# Patient Record
Sex: MALE | Race: WHITE
[De-identification: names, ages, dates, MRNs, and addresses within clinical notes are randomized per-mention and may not be internally consistent; named-entity substitution may affect disease eponyms.]

---

## 2017-11-14 ENCOUNTER — HOSPITAL ENCOUNTER (OUTPATIENT)
Dept: HOSPITAL 45 - C.CTS | Age: 67
Discharge: HOME | End: 2017-11-14
Attending: PSYCHIATRY & NEUROLOGY
Payer: COMMERCIAL

## 2017-11-14 DIAGNOSIS — R29.6: Primary | ICD-10-CM

## 2017-11-14 NOTE — DIAGNOSTIC IMAGING REPORT
HEAD CT NONCONTRAST



CT DOSE: 614.27 mGy.cm



HISTORY:      R29.6 Multiple falls [id number]



TECHNIQUE: Multiaxial CT images of the head were performed without the use of

intravenous contrast. Automated exposure control was utilized for this study.  A

dose lowering technique was utilized adhering to the principles of ALARA.



Comparison: None.



Findings: The paranasal sinuses and mastoid air cells are clear. The calvarium

and skull base are intact. The ventricles and sulci are within normal limits.

There is no mass, hematoma, midline shift, or acute infarct.



Impression:

No acute intracranial abnormality. 







Electronically signed by:  Иван Cotto M.D.

11/14/2017 11:49 AM



Dictated Date/Time:  11/14/2017 11:39 AM

## 2017-11-20 LAB
ANION GAP SERPL CALC-SCNC: 8 MMOL/L (ref 3–11)
APPEARANCE UR: CLEAR
BASOPHILS # BLD: 0.02 K/UL (ref 0–0.2)
BASOPHILS NFR BLD: 0.3 %
BILIRUB UR-MCNC: (no result) MG/DL
BUN SERPL-MCNC: 14 MG/DL (ref 7–18)
BUN/CREAT SERPL: 14.8 (ref 10–20)
CALCIUM SERPL-MCNC: 9 MG/DL (ref 8.5–10.1)
CHLORIDE SERPL-SCNC: 101 MMOL/L (ref 98–107)
CO2 SERPL-SCNC: 29 MMOL/L (ref 21–32)
COLOR UR: YELLOW
COMPLETE: YES
CREAT CL PREDICTED SERPL C-G-VRATE: 77.7 ML/MIN
CREAT SERPL-MCNC: 0.94 MG/DL (ref 0.6–1.4)
EOSINOPHIL NFR BLD AUTO: 281 K/UL (ref 130–400)
GLUCOSE SERPL-MCNC: 89 MG/DL (ref 70–99)
HCT VFR BLD CALC: 41.9 % (ref 42–52)
IG%: 0.3 %
IMM GRANULOCYTES NFR BLD AUTO: 23.9 %
INR PPP: 1.1 (ref 0.9–1.1)
LYMPHOCYTES # BLD: 1.62 K/UL (ref 1.2–3.4)
MANUAL MICROSCOPIC REQUIRED?: NO
MCH RBC QN AUTO: 31.1 PG (ref 25–34)
MCHC RBC AUTO-ENTMCNC: 35.1 G/DL (ref 32–36)
MCV RBC AUTO: 88.8 FL (ref 80–100)
MONOCYTES NFR BLD: 8.7 %
NEUTROPHILS # BLD AUTO: 2.4 %
NEUTROPHILS NFR BLD AUTO: 64.4 %
NITRITE UR QL STRIP: (no result)
PARTIAL THROMBOPLASTIN RATIO: 1.2
PH UR STRIP: 6.5 [PH] (ref 4.5–7.5)
PMV BLD AUTO: 8.7 FL (ref 7.4–10.4)
POTASSIUM SERPL-SCNC: 3.9 MMOL/L (ref 3.5–5.1)
PROTHROMBIN TIME: 11.6 SECONDS (ref 9–12)
RBC # BLD AUTO: 4.72 M/UL (ref 4.7–6.1)
REVIEW REQ?: NO
SODIUM SERPL-SCNC: 138 MMOL/L (ref 136–145)
SP GR UR STRIP: 1.02 (ref 1–1.03)
URINE BILL WITH OR WITHOUT MIC: (no result)
UROBILINOGEN UR-MCNC: (no result) MG/DL
WBC # BLD AUTO: 6.78 K/UL (ref 4.8–10.8)

## 2017-11-20 NOTE — DIAGNOSTIC IMAGING REPORT
CHEST 2 VIEWS ROUTINE



CLINICAL HISTORY: Preoperative chest    



COMPARISON STUDY:  No previous studies for comparison.



FINDINGS: The cardiac and mediastinal contours are normal. There is no evidence

of focal pulmonary consolidation. There is no evidence of failure. No pleural

effusions are visualized.[ 



IMPRESSION: No active disease in the chest.







Electronically signed by:  Yuri Rosa M.D.

11/20/2017 4:05 PM



Dictated Date/Time:  11/20/2017 4:04 PM

## 2017-11-20 NOTE — PAT MEDICATION INSTRUCTIONS
Service Date


Nov 20, 2017.





Current Home Medication List


Aspirin (Aspirin Ec), 81 MG PO QAM


Cholecalciferol (Vitamin D3), 5,000 TAB PO QAM


Glucosamine Sulfate (Glucosamine), 1,000 MG PO


Memantine (Namenda), 28 MG PO QAM


Multivitamin (Multivitamin), 1 TAB PO QPM


Naproxen (Aleve), 220 MG PO PRN


Ocuvite Preservision (Ocuvite Preservision), 1 TAB PO BID


[Vitamin B12], 1 TAB PO QAM





Medication Instructions


For Your Scheduled Surgery 





- Check with surgeon for instructions: 


Naproxen (Aleve), 220 MG PO PRN


Aspirin (Aspirin Ec), 81 MG PO QAM








- Hold the following medications 2 weeks prior to surgery:


Glucosamine Sulfate (Glucosamine), 1,000 MG PO








- Hold the following medications the morning of surgery:


Cholecalciferol (Vitamin D3), 5,000 TAB PO QAM


Multivitamin (Multivitamin), 1 TAB PO QPM


Ocuvite Preservision (Ocuvite Preservision), 1 TAB PO BID


[Vitamin B12], 1 TAB PO QAM








- Take the following medications the morning of surgery with a sip of water:


Memantine (Namenda), 28 MG PO QAM








If you have any questions please call us at [phone] or [phone] or 

[phone]

## 2017-11-21 LAB — EST. AVERAGE GLUCOSE BLD GHB EST-MCNC: 105 MG/DL

## 2017-11-21 NOTE — HISTORY & PHYSICAL EXAMINATION
DATE OF ADMISSION:  12/05/2017

 

CHIEF COMPLAINT:  Right knee pain.

 

HISTORY OF PRESENT ILLNESS:  Mr. Santana is a 67-year-old male with a 1-year

history of right knee pain.  He rates his pain at 6/10.  He has pain with his

daily activities.  He has limited standing and walking tolerance.  Pain is

worse with weightbearing.  The patient has had injections, NSAIDs, and knee

brace in the past without relief.  He also has previous history of right knee

meniscectomy and physical therapy.  He has failed conservative treatment and

is scheduled for right knee replacement.

 

PAST MEDICAL HISTORY:  History of prostate cancer and dementia.  He denies

heart disease, diabetes or DVT.

 

PAST SURGICAL HISTORY:  Right knee meniscectomy and prostatectomy.

 

SOCIAL HISTORY:  The patient drinks 6 drinks per week.  He denies tobacco

use.  He lives in a 2-story home.  He is  and retired.

 

FAMILY HISTORY:  Negative for DVT.

 

MEDICATIONS:  Ocuvite 1 tablet daily, glucosamine 500 mg, vitamin D3 at 5000

units, vitamin B12 at 1000 mcg, Namenda 10 mg 2 times daily, Centrum Silver

400 mcg, aspirin 81 mg daily, and Aleve p.r.n.

 

ALLERGIES:  None.

 

REVIEW OF SYSTEMS:  See HPI.  Ten other systems reviewed, all negative.

 

PHYSICAL EXAMINATION:

VITAL SIGNS:  Height 5 feet 10 inches.  Weight 165 pounds.

GENERAL:  This is a well-developed and well-nourished male, who is alert and

oriented x3.  Mood and affect are appropriate.

HEENT:  Normocephalic and atraumatic.  Mucous membranes are moist and intact.

NECK:  Supple without lymphadenopathy.

HEART:  Regular rate and rhythm without murmurs, rubs or gallops.

LUNGS:  Clear to auscultation without wheezes or rhonchi.

ABDOMEN:  Soft and nontender.  Bowel sounds are equal and active.

EXTREMITIES:  No ecchymosis, redness or warmth.  He has a varus deformity. 

He has scar medially.  Range of motion is from 0-120 degrees with +1 laxity. 

He has mild effusion.  He has no distal edema.  He is neurovascularly intact

with +5/5 strength.

 

X-RAY EXAMINATION:  AP and lateral views show joint space narrowing and

osteophyte formation.

 

IMPRESSION:  Degenerative joint disease, right knee.

 

PLAN:  The patient will be admitted for a right total knee arthroplasty.  We

will plan on aspirin 325 mg b.i.d. for DVT prophylaxis.  He will have

[facility] for home physical therapy upon discharge.

## 2017-12-05 ENCOUNTER — HOSPITAL ENCOUNTER (INPATIENT)
Dept: HOSPITAL 45 - C.ACU | Age: 67
LOS: 1 days | Discharge: HOME HEALTH | DRG: 470 | End: 2017-12-06
Attending: ORTHOPAEDIC SURGERY | Admitting: ORTHOPAEDIC SURGERY
Payer: COMMERCIAL

## 2017-12-05 VITALS — HEART RATE: 73 BPM | SYSTOLIC BLOOD PRESSURE: 104 MMHG | OXYGEN SATURATION: 98 % | DIASTOLIC BLOOD PRESSURE: 66 MMHG

## 2017-12-05 VITALS
HEART RATE: 70 BPM | TEMPERATURE: 97.7 F | DIASTOLIC BLOOD PRESSURE: 53 MMHG | OXYGEN SATURATION: 94 % | SYSTOLIC BLOOD PRESSURE: 102 MMHG

## 2017-12-05 VITALS
DIASTOLIC BLOOD PRESSURE: 91 MMHG | OXYGEN SATURATION: 98 % | SYSTOLIC BLOOD PRESSURE: 149 MMHG | HEART RATE: 84 BPM | TEMPERATURE: 97.88 F

## 2017-12-05 VITALS
OXYGEN SATURATION: 94 % | DIASTOLIC BLOOD PRESSURE: 65 MMHG | SYSTOLIC BLOOD PRESSURE: 109 MMHG | HEART RATE: 77 BPM | TEMPERATURE: 98.24 F

## 2017-12-05 VITALS
TEMPERATURE: 98.24 F | SYSTOLIC BLOOD PRESSURE: 92 MMHG | HEART RATE: 73 BPM | OXYGEN SATURATION: 96 % | DIASTOLIC BLOOD PRESSURE: 52 MMHG

## 2017-12-05 VITALS
DIASTOLIC BLOOD PRESSURE: 60 MMHG | HEART RATE: 77 BPM | TEMPERATURE: 97.7 F | OXYGEN SATURATION: 94 % | SYSTOLIC BLOOD PRESSURE: 100 MMHG

## 2017-12-05 VITALS — OXYGEN SATURATION: 98 % | SYSTOLIC BLOOD PRESSURE: 109 MMHG | HEART RATE: 81 BPM | DIASTOLIC BLOOD PRESSURE: 66 MMHG

## 2017-12-05 VITALS — DIASTOLIC BLOOD PRESSURE: 61 MMHG | SYSTOLIC BLOOD PRESSURE: 105 MMHG | HEART RATE: 72 BPM | OXYGEN SATURATION: 98 %

## 2017-12-05 VITALS
WEIGHT: 56.22 LBS | BODY MASS INDEX: 8.05 KG/M2 | HEIGHT: 70 IN | WEIGHT: 56.22 LBS | HEIGHT: 70 IN | BODY MASS INDEX: 8.05 KG/M2 | BODY MASS INDEX: 8.05 KG/M2

## 2017-12-05 VITALS — DIASTOLIC BLOOD PRESSURE: 58 MMHG | SYSTOLIC BLOOD PRESSURE: 97 MMHG | HEART RATE: 78 BPM | OXYGEN SATURATION: 98 %

## 2017-12-05 DIAGNOSIS — Z72.89: ICD-10-CM

## 2017-12-05 DIAGNOSIS — Z79.82: ICD-10-CM

## 2017-12-05 DIAGNOSIS — M17.11: Primary | ICD-10-CM

## 2017-12-05 DIAGNOSIS — F03.90: ICD-10-CM

## 2017-12-05 DIAGNOSIS — Z85.46: ICD-10-CM

## 2017-12-05 PROCEDURE — 0SRC0J9 REPLACEMENT OF RIGHT KNEE JOINT WITH SYNTHETIC SUBSTITUTE, CEMENTED, OPEN APPROACH: ICD-10-PCS | Performed by: ORTHOPAEDIC SURGERY

## 2017-12-05 RX ADMIN — ACETAMINOPHEN SCH MG: 500 TABLET, COATED ORAL at 14:12

## 2017-12-05 RX ADMIN — Medication SCH MG: at 20:57

## 2017-12-05 RX ADMIN — ALUMINUM ZIRCONIUM TRICHLOROHYDREX GLY SCH EA: 0.2 STICK TOPICAL at 15:42

## 2017-12-05 RX ADMIN — ACETAMINOPHEN SCH MG: 500 TABLET, COATED ORAL at 20:58

## 2017-12-05 RX ADMIN — DOCUSATE SODIUM SCH MG: 100 CAPSULE, LIQUID FILLED ORAL at 20:58

## 2017-12-05 RX ADMIN — CEFAZOLIN SCH MLS/MIN: 10 INJECTION, POWDER, FOR SOLUTION INTRAVENOUS at 15:41

## 2017-12-05 RX ADMIN — TRANEXAMIC ACID SCH MLS/MIN: 100 INJECTION, SOLUTION INTRAVENOUS at 13:21

## 2017-12-05 RX ADMIN — SODIUM CHLORIDE SCH MLS/HR: 900 INJECTION, SOLUTION INTRAVENOUS at 14:12

## 2017-12-05 RX ADMIN — TRANEXAMIC ACID SCH MLS/MIN: 100 INJECTION, SOLUTION INTRAVENOUS at 06:45

## 2017-12-05 RX ADMIN — SODIUM CHLORIDE SCH MLS/HR: 900 INJECTION, SOLUTION INTRAVENOUS at 22:37

## 2017-12-05 NOTE — ANESTHESIOLOGY PROGRESS NOTE
Anesthesia Post Op Note


Date & Time


Dec 5, 2017 at 10:54





Vital Signs


Pain Intensity:  0





Vital Signs Past 12 Hours








  Date Time  Temp Pulse Resp B/P (MAP) Pulse Ox O2 Delivery O2 Flow Rate FiO2


 


12/5/17 10:50 37 71 16 110/77 97 Nasal Cannula 2 


 


12/5/17 10:40  73 16 115/68 98 Nasal Cannula 2 


 


12/5/17 10:30  74 16 115/70 97 Nasal Cannula 2 


 


12/5/17 10:20  80 19 114/69 97 Nasal Cannula 2 


 


12/5/17 10:10  78 16 103/60 95 Oxymask 10 


 


12/5/17 10:00 36.8 89 16 107/59 96 Oxymask 10 


 


12/5/17 05:49 36.6 84 18 149/91 98 Room Air  











Notes


Mental Status:  alert / awake / arousable, participated in evaluation


Pt Amnestic to Procedure:  Yes


Nausea / Vomiting:  adequately controlled


Pain:  adequately controlled


Airway Patency, RR, SpO2:  stable & adequate


BP & HR:  stable & adequate


Hydration State:  stable & adequate


Anesthetic Complications:  no major complications apparent

## 2017-12-05 NOTE — DIAGNOSTIC IMAGING REPORT
TWO VIEWS RIGHT KNEE



CLINICAL HISTORY:  Postoperative examination.



FINDINGS: AP and crosstable lateral portable views of the right knee are

obtained. A right knee arthroplasty is in near anatomic alignment. There has

been undersurface remodeling of the patella. No acute fracture is seen. There

are expected postoperative changes around the knee including a surgical drain,

soft tissue edema, and subcutaneous gas.



IMPRESSION: Expected postoperative changes status post right knee arthroplasty.

No acute fracture is seen.







Electronically signed by:  Diaz Antony M.D.

12/5/2017 10:29 AM



Dictated Date/Time:  12/5/2017 10:28 AM

## 2017-12-05 NOTE — [facility] OPERATIVE REPORT
Operative Report


Operative Date


Dec 5, 2017.





Pre-Operative Diagnosis





Degenerative joint disease, right knee





Post-Operative Diagnosis





Same as preop





Procedure(s) Performed





Right total knee arthroplasty





Surgeon


Dr. Casarez





Assistant Surgeon(s)


J. Illig, PA-C





Estimated Blood Loss


25 cc





Findings


see dictated op note





Specimens





A: right knee bone and tissue





Drains


none





Anesthesia


spinal





Complication(s)


None





Disposition


Recovery Room / PACU





Indications


A 67-year-old male presents with long history of right knee severe 

tricompartmental DJD which is failed outpatient conservative treatments 

including NSAIDs, bracing, cortisone injections home walking/exercise program.  

His symptoms have progressed to the point where it has been difficult for him 

to perform normal activities of daily living.  





I have indicated the patient for a right total knee arthroplasty, the risks and 

benefits and complications of the procedure include but are not limited to 

infection bleeding damage to bone nerves vessels surrounding soft tissue, blood 

clots loss of function leg length discrepancy dislocation failure of the 

components need for additional surgery and death. 





The patient wished to proceed with surgery at this time and informed consent 

was obtained.  Appropriate clearances were obtained.





Description of Procedure


Following induction of spinal anesthesia, a tourniquet was applied to the 

proximal aspect of the  thigh and the patient's right leg was prepped and 

draped in the usual sterile manner.  A timeout was performed and site mk 

verified.    Limb was exsanguinated with an esmarch bandage and tourniquet was 

inflated to 300 mmHg. A longitudinal midline incision was made over the 

anterior knee. Subcutaneous tissue was sharply dissected down to fascia. 

Electrocautery was used for hemostasis. Next a parapatellar arthrotomy was 

performed. Patella was everted and the knee was flexed. A ledezma retractor 

was used to expose the synovium above on the anterior aspect of the femur and 

removed down to bone. Next, the anterior fat pad was removed to aid in 

visualization.  The medial face of the tibia was cleared of soft tissue first 

with a bovie and a blas elevator. This tissue was retracted posteriorly using a 

blunt hohmann.   


Next the extramedullary tibial cutting guide was placed to the anterior aspect 

of the tibia.    The tibial resection level was set taking 2mm from the 

defective tibial condyle. The medial and lateral collateral ligament were 

protected with that homans.   The tibia guide was removed and proximal tibial 

bone fragment removed utilizing straight osteotome, electrocautery and kocher.  





Next, the distal femur intramedullary canal was accessed utilizing the step 

drill. The intramedullary distal femur cutting guide was placed into the canal 

and pinned into place.   The distal femur was cut on the +2


 setting.  Next the cutting guide was removed and the femur was sized.   Care 

was taken to ensure appropriate extern all rotation and 3 degree holes were 

drilled.  A size 10 4-in-1 cutting block was placed on the distal end of the 

femur and secured into place with two short headed screws.   To bent homans 

were placed to protect the medial and lateral collateral ligaments.   The 

oscillating saw was used to cut anterior, posterior, anterior chamfer and 

posterior chamfer.   The four and one cutting block was removed and bone 

fragments excised.   Laminar  was placed laterally and the ACL and PCL 

were removed followed by the medial meniscus and posterior medial osteophytes. 

  Aquamantys was utilized for any posterior medial bleeders and Orthomix 

injected into the posterior medial capsule.   A laminar  was then 

placed in the medial compartment and the lateral meniscus and posterior 

osteophytes were removed.  Aquamantys was utilized for any posterior lateral 

bleeders and Orthomix injected into the posterior lateral capsule.   


Next, drop candido and spacer block were placed with the leg in flexion and 

extension to assess alignment and flexion/extension gaps.   


Next the proximal tibia was assessed and two bent Homans were placed medial and 

lateral to aid in visualization.  The appropriate tibia size and rotation was 

selected and a size G tibial plate was pinned into place with appropriate 

rotation.   Preparation of the tibia was completed utilizing the matching 

tibial drill and broach.


I then turned my attention back to the distal femur in a trial femoral 

component was impacted into place. Appropriate femoral width was assessed and 

selected. Next the femur PS box cut guide was placed and cut made with the 

reciprocal saw and the PS box provisional placed. A trial size 10 mm tibia 

articular tray was placed and varus-valgus balance assessed in 0 degrees of 

extension and 30, 60 and 90 degrees of flexion.  A final tibial articular 

surface size 10 mm was chosen. 





Assess was gained to the patella and caliper utilized to measure width.  The 

patella reamer was utilized and remaining bone removed with oscillating saw.   

A size 38 mm patella button was selected and the patella pegs drilled.  Trial 

patella button was placed and tracking was assessed.  The knee was found to be 

well balanced, well aligned with excellent patella tracking.  








The trials were removed and final components were obtained and assembled. The 

knee was irrigated copiously with sterile saline solution mixed with 

bacitracin. Access to the proximal tibia was once again obtained utilizing to 

the homans and the proximal tibia and distal femur were dried with lap sponges. 

The final components were cemented into place and all excess cement was 

removed.  A trial tibial articular surface was placed while cemented hardened.  

Knee stability was once again assessed and the final component inserted.  The 

knee was injected with the remaining Orthomix solution and irrigated once more 

with sterile saline solution mixed with bacitracin. The capsulotomy was closed 

with #1 Vicryl followed by subcutaneous closure with 2-0 Vicryl suture and a 3-

0 V-lock suture. Skin closure was performed using Prineo dressing followed by 

Renato, 4 x 4s and ace wrap. The patient tolerated the procedure well and was 

taken to the PACU in stable condition.   





Due to the complex nature of the procedure, the entire surgery was performed


with the operational assistance of Jen Illig, PA-C.  The assistant, under


direct supervision, was involved in the actual performance of all aspects of


the surgical procedure including hemostasis, tissue retraction and incision,


instrument management, patient positioning, and wound closure.


I attest to the content of the Intraoperative Record and any orders documented 

therein.  Any exceptions are noted below.


I attest to the content of the Intraoperative Record and any orders documented 

therein.  Any exceptions are noted below.

## 2017-12-05 NOTE — ORTHOPEDIC PROGRESS NOTE
Orthopedic Progress Note


Date of Service


Dec 5, 2017.





Subjective


Additional Notes:


Post op progress note





Patient seen at bedside, s/p Right TKA, comfortable, sensation returning to b/l 

lower extremities, pain well controlled, no acute issues.  Does complain of 

numbness to right hand, 1-3 digits.  Denies N/V/CP/SOB





Objective


NAD, AOx3


RUE NVSI +M/R/U/AIN/PIN motor, sensory intact to light touch grossly, with the 

exception of the 1,2,3 digits to sensory.  Compartments soft NT, 5/5 gripe 

strength.  CR< 2 seconds, Full painless ROM shoulder, elbow, wrist, fingers


RLE NVSI +EHL/FHL/TA/GS SILT grossly, CR< 2 seconds, +2 DP pulse, dressing cdi, 

compartments soft NT, SCDs in place











  Date Time  Temp Pulse Resp B/P (MAP) Pulse Ox O2 Delivery O2 Flow Rate FiO2


 


12/5/17 15:08 36.8 73 16 92/52 (65) 96 Nasal Cannula 2.0 


 


12/5/17 14:10  78 16 97/58 (71) 98   


 


12/5/17 13:10  81 17 109/66 (80) 98   


 


12/5/17 12:10  73 16 104/66 (79) 98 Nasal Cannula 2.0 


 


12/5/17 11:40  72 16 105/61 (76) 98 Nasal Cannula 2.0 


 


12/5/17 11:10     94 Nasal Cannula 2.0 


 


12/5/17 11:10 36.8 77 16 109/65 (80) 94 Nasal Cannula 2.0 


 


12/5/17 11:10     94 Nasal Cannula 2.0 


 


12/5/17 10:50 37 71 16 110/77 97 Nasal Cannula 2 


 


12/5/17 10:40  73 16 115/68 98 Nasal Cannula 2 


 


12/5/17 10:30  74 16 115/70 97 Nasal Cannula 2 


 


12/5/17 10:20  80 19 114/69 97 Nasal Cannula 2 


 


12/5/17 10:10  78 16 103/60 95 Oxymask 10 


 


12/5/17 10:00 36.8 89 16 107/59 96 Oxymask 10 


 


12/5/17 05:49 36.6 84 18 149/91 98 Room Air  











Assessment & Plan


Assessment:


s/p R TKA


RUE median nerve paraesthesia


Plan:


-Ancef x 24


-ASA/SCDs for DVT PPX


-Pain controlled


-IV fluids x 24


-PO XR - well aligned, well fixed TKA, no fracture/dislocation.  


-RUE median nerve paraesthesia likely positional, continue to monitor, NV 

checks.  


-WBAT RLE


-PT/OT


-Am labs

## 2017-12-06 VITALS
OXYGEN SATURATION: 94 % | TEMPERATURE: 97.52 F | SYSTOLIC BLOOD PRESSURE: 120 MMHG | HEART RATE: 73 BPM | DIASTOLIC BLOOD PRESSURE: 70 MMHG

## 2017-12-06 VITALS
TEMPERATURE: 97.52 F | OXYGEN SATURATION: 94 % | SYSTOLIC BLOOD PRESSURE: 120 MMHG | HEART RATE: 73 BPM | DIASTOLIC BLOOD PRESSURE: 70 MMHG

## 2017-12-06 VITALS — SYSTOLIC BLOOD PRESSURE: 112 MMHG | DIASTOLIC BLOOD PRESSURE: 63 MMHG

## 2017-12-06 VITALS — OXYGEN SATURATION: 96 %

## 2017-12-06 VITALS
TEMPERATURE: 97.52 F | SYSTOLIC BLOOD PRESSURE: 122 MMHG | HEART RATE: 64 BPM | DIASTOLIC BLOOD PRESSURE: 70 MMHG | OXYGEN SATURATION: 96 %

## 2017-12-06 VITALS
DIASTOLIC BLOOD PRESSURE: 58 MMHG | TEMPERATURE: 98.06 F | HEART RATE: 70 BPM | SYSTOLIC BLOOD PRESSURE: 102 MMHG | OXYGEN SATURATION: 97 %

## 2017-12-06 LAB
ANION GAP SERPL CALC-SCNC: 8 MMOL/L (ref 3–11)
BUN SERPL-MCNC: 24 MG/DL (ref 7–18)
BUN/CREAT SERPL: 21.3 (ref 10–20)
CALCIUM SERPL-MCNC: 8.2 MG/DL (ref 8.5–10.1)
CHLORIDE SERPL-SCNC: 108 MMOL/L (ref 98–107)
CO2 SERPL-SCNC: 24 MMOL/L (ref 21–32)
CREAT CL PREDICTED SERPL C-G-VRATE: 22.8 ML/MIN
CREAT SERPL-MCNC: 1.12 MG/DL (ref 0.6–1.4)
EOSINOPHIL NFR BLD AUTO: 306 K/UL (ref 130–400)
GLUCOSE SERPL-MCNC: 113 MG/DL (ref 70–99)
HCT VFR BLD CALC: 36.7 % (ref 42–52)
MCH RBC QN AUTO: 30.7 PG (ref 25–34)
MCHC RBC AUTO-ENTMCNC: 34.1 G/DL (ref 32–36)
MCV RBC AUTO: 90.2 FL (ref 80–100)
PMV BLD AUTO: 9 FL (ref 7.4–10.4)
POTASSIUM SERPL-SCNC: 3.7 MMOL/L (ref 3.5–5.1)
RBC # BLD AUTO: 4.07 M/UL (ref 4.7–6.1)
SODIUM SERPL-SCNC: 140 MMOL/L (ref 136–145)
WBC # BLD AUTO: 19.47 K/UL (ref 4.8–10.8)

## 2017-12-06 RX ADMIN — ACETAMINOPHEN SCH MG: 500 TABLET, COATED ORAL at 06:08

## 2017-12-06 RX ADMIN — Medication SCH MG: at 08:44

## 2017-12-06 RX ADMIN — SODIUM CHLORIDE SCH MLS/HR: 900 INJECTION, SOLUTION INTRAVENOUS at 09:54

## 2017-12-06 RX ADMIN — CEFAZOLIN SCH MLS/MIN: 10 INJECTION, POWDER, FOR SOLUTION INTRAVENOUS at 00:31

## 2017-12-06 RX ADMIN — ALUMINUM ZIRCONIUM TRICHLOROHYDREX GLY SCH EA: 0.2 STICK TOPICAL at 08:00

## 2017-12-06 RX ADMIN — ACETAMINOPHEN SCH MG: 500 TABLET, COATED ORAL at 14:14

## 2017-12-06 RX ADMIN — DOCUSATE SODIUM SCH MG: 100 CAPSULE, LIQUID FILLED ORAL at 08:44

## 2017-12-06 RX ADMIN — ALUMINUM ZIRCONIUM TRICHLOROHYDREX GLY SCH EA: 0.2 STICK TOPICAL at 00:00

## 2017-12-06 NOTE — DISCHARGE INSTRUCTIONS
Discharge Instructions


Date of Service


Dec 6, 2017.





Admission


Reason for Admission:  Right Knee Osteoarthritis





Discharge


Discharge Diagnosis / Problem:  S/P R TKA





Discharge Goals


Goal(s):  Decrease discomfort, Improve function, Increase independence





Activity Recommendations


Activity Limitations:  per Instructions/Follow-up section





.





Instructions / Follow-Up


Instructions / Follow-Up


ACTIVITY RECOMMENDATIONS:





SELF CARE INSTRUCTIONS AFTER TOTAL KNEE REPLACEMENT





A.  You may need to continue a physical therapy program after discharge from 

the hospital.  There are several options available to you. 


      Your doctor will assist you in selecting the best one for you.





   1.  An out-patient facility 2 to 3 times a week for therapy or home therapy.


   2.  Continue working on all exercises taught to you in the hospital.  Your


                 goals should be to increase bending of your knee to 90 degrees 

and


                 beyond and to fully straighten your knee.





B.  You may progress at your own pace from walking with a walker or crutches to 

a cane; then to no assistive devices.





C.   Make walking a part of your daily routine.  Be up as much as comfortable 

with rest periods throughout the day.  


      Rest with leg elevation is very important. 


      Use the ice wrap frequently for the first 3-4 weeks.





D.  There are no restrictions on activities.  You may ride in a car, shop, 

participate in household chores and all social activities.





E.  Wear the long elastic stockings (MORTEZA hose) 20 hours a day for 2 weeks after 

surgery.  


     They can be removed several times a day for laundering and for a bath.





F.  You may shower, no tub baths until cleared by your doctor.








SPECIAL CARE INSTRUCTIONS:





**VERY IMPORTANT TO READ AND REVIEW**





A.  There are a few signs you need to watch for after you are home.  Call  

[facility] if you notice any of the followin.  Increased severe knee pain.  Some pain is expected especially  when you 

exercise.


   2.  Increased swelling in your leg or knee; pain or swelling of the calf 

muscle in either lower leg.


   3.  Any fluid drainage from the incision.


   4.  Shortness of breath or chest pain.





B.  Please call [facility] [place] at [phone] if you have any  

concerns or questions about your operation or recovery.  


     The doctor or his nurse will return your call promptly.





C.  You must take antibiotics before dental work, bladder, bowel or other 

surgery.  


      Your doctor will provide you with a permanent care to carry describing 

this precaution.





IMPORTANT:





*  REMEMBER TO TAKE ASPIRIN, 325 MG, TWICE DAILY FOR 4 WEEKS UNLESS OTHERWISE 

DIRECTED.  


   THIS IS YOUR BLOOD THINNER.





*  HIGH RISK PATIENTS MAY BE PRESCRIBED A STRONGER BLOOD THINNER.  


   THIS WILL BE PROVIDED AT DISCHARGE.





*  CALL IF INCREASED PAIN, REDNESS, DRAINAGE OR FEVER GREATER THAT 101.





*  WEAR MORTEZA HOSE 20 HOURS PER DAY FOR 2 WEEKS.





*  YOUR DRESSING IS A DERMABOND PRINEO DRESSING, DO NOT REMOVE.  THIS WILL  

REMAIN ON YOUR INCISION AND SLOWLY FALL OFF OVERTIME.   


    IF INCISION IS LEAKING THROUGH DRESSING, CALL THE OFFICE [phone].








FOLLOW UP VISIT:





If appointment is not already scheduled:





Please call [facility] [place] to make a follow-up appointment for 


2 weeks after your surgery at [phone].





Current Hospital Diet


Patient's current hospital diet: Regular Diet





Discharge Diet


Recommended Diet:  Regular Diet





Procedures


Procedures Performed:  


Right total knee arthroplasty





Pending Studies


Studies pending at discharge:  no





Laboratory Results





Hemoglobin A1c








Test


  17


15:20 Range/Units


 


 


Estimated Average Glucose 105   mg/dl


 


Hemoglobin A1c 5.3  4.5-5.6  %











Medical Emergencies








.


Who to Call and When:





Medical Emergencies:  If at any time you feel your situation is an emergency, 

please call 911 immediately.





.





Non-Emergent Contact


Non-Emergency issues call your:  Surgeon


.








"Provider Documentation" section prepared by Niranjan Casarez.








.





VTE Core Measure


Inpt VTE Proph given/why not?:  Other Anticoagulation, T.E.D. Stockings, SCD's





[place] Drug [place]


Search Results:  patient reviewed within database, no issues identified

## 2017-12-06 NOTE — ANESTHESIOLOGY PROGRESS NOTE
Anesthesia Post Op Note


Date & Time


Dec 6, 2017 at 14:18





Vital Signs





Vital Signs Past 12 Hours








  Date Time  Temp Pulse Resp B/P (MAP) Pulse Ox O2 Delivery O2 Flow Rate FiO2


 


12/6/17 11:38 36.4 73 19 120/70 (87) 94 Room Air  


 


12/6/17 08:50     96 Room Air  


 


12/6/17 07:51 36.4 64 18 122/70 (87) 96 Room Air  


 


12/6/17 07:40      Room Air  


 


12/6/17 03:09 36.7 70 17 102/58 (73) 97 Room Air  











Notes


Mental Status:  alert / awake / arousable, participated in evaluation


Pt Amnestic to Procedure:  Yes


Nausea / Vomiting:  adequately controlled


Pain:  adequately controlled


Airway Patency, RR, SpO2:  stable & adequate


BP & HR:  stable & adequate


Hydration State:  stable & adequate


Neuraxial Anesthesia:  was administered, sensory block resolved


Anesthetic Complications:  no major complications apparent

## 2017-12-06 NOTE — ORTHOPEDIC PROGRESS NOTE
Orthopedic Progress Note


Date of Service


Dec 6, 2017.





Subjective


Post OP Day:  1


Reports: feeling well, Denies: chest pain, SOB, nausea / vomiting, light 

headedness, calf pain


Additional Notes:


Having some mild pain in the back of the knee.  No other complaints.





Objective


calves soft nontender, N/V intact, dressing C/D/I, A&O x3, toes mobile


Tingling in fingers resolving











  Date Time  Temp Pulse Resp B/P (MAP) Pulse Ox O2 Delivery O2 Flow Rate FiO2


 


12/6/17 07:51 36.4 64 18 122/70 (87) 96 Room Air  


 


12/6/17 03:09 36.7 70 17 102/58 (73) 97 Room Air  


 


12/6/17 00:30      Room Air  


 


12/5/17 22:54 36.5 70 16 102/53 (69) 94 Room Air  


 


12/5/17 19:26 36.5 77 18 100/60 (73) 94 Room Air  


 


12/5/17 15:25      Room Air  


 


12/5/17 15:08 36.8 73 16 92/52 (65) 96 Nasal Cannula 2.0 


 


12/5/17 14:10  78 16 97/58 (71) 98   


 


12/5/17 13:10  81 17 109/66 (80) 98   


 


12/5/17 12:10  73 16 104/66 (79) 98 Nasal Cannula 2.0 


 


12/5/17 11:40  72 16 105/61 (76) 98 Nasal Cannula 2.0 


 


12/5/17 11:10     94 Nasal Cannula 2.0 


 


12/5/17 11:10 36.8 77 16 109/65 (80) 94 Nasal Cannula 2.0 


 


12/5/17 11:10     94 Nasal Cannula 2.0 


 


12/5/17 10:50 37 71 16 110/77 97 Nasal Cannula 2 


 


12/5/17 10:40  73 16 115/68 98 Nasal Cannula 2 


 


12/5/17 10:30  74 16 115/70 97 Nasal Cannula 2 


 


12/5/17 10:20  80 19 114/69 97 Nasal Cannula 2 


 


12/5/17 10:10  78 16 103/60 95 Oxymask 10 


 


12/5/17 10:00 36.8 89 16 107/59 96 Oxymask 10 








Laboratory Results 24 Hours:











Test


  12/6/17


06:30


 


Hematocrit 36.7 % 


 


Hemoglobin 12.5 g/dL 











Assessment & Plan


Assessment:


POD 1 s/p R TKA


RUE median nerve paraesthesia


Leukocytosis


Plan:


- Leukocytosis likely due to surgical stress/preop steroids


-ASA/SCDs for DVT PPX


-RUE median nerve paraesthesia likely positional, continue to monitor, NV 

checks.  


-WBAT RLE


-PT/OT








Inhouse Planning


Pain Management:  Morphine, PO Tylenol, Oxy IR


DVT Prophylaxis:  TEDs, SCDs, ASA





Discharge Planning


Discharge Planning:  home with home health

## 2018-07-23 ENCOUNTER — HOSPITAL ENCOUNTER (OUTPATIENT)
Dept: HOSPITAL 45 - C.CTS | Age: 68
Discharge: HOME | End: 2018-07-23
Attending: PSYCHIATRY & NEUROLOGY
Payer: COMMERCIAL
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
## 2018-07-23 DIAGNOSIS — R29.6: ICD-10-CM

## 2018-07-23 DIAGNOSIS — R41.0: Primary | ICD-10-CM

## 2018-07-23 DIAGNOSIS — S09.90XA: ICD-10-CM

## 2018-07-23 NOTE — DIAGNOSTIC IMAGING REPORT
HEAD CT NONCONTRAST



CT DOSE: 638.56 mGycm



HISTORY:      R29.6 Multiple [id number] Head [id number] Confusion



TECHNIQUE: Multiaxial CT images of the head were performed without the use of

intravenous contrast. Automated exposure control was utilized for this study.  A

dose lowering technique was utilized adhering to the principles of ALARA.



Comparison: Head CT 11/14/2017.



Findings: The paranasal sinuses and mastoid air cells are clear. The calvarium

and skull base are intact. The ventricles and sulci are within normal limits.

There is no mass, hematoma, midline shift, or acute infarct.



Impression:

No acute intracranial abnormality. 







Electronically signed by:  Иван Cotto M.D.

7/23/2018 4:46 PM



Dictated Date/Time:  7/23/2018 4:37 PM